# Patient Record
(demographics unavailable — no encounter records)

---

## 2025-05-23 NOTE — PLAN
[FreeTextEntry1] : recommend barrier cream - aquaphor or desitin recommend fluconazole 150mg x 1 dose - may repeat in 2days if still symptomatic for yeast vaginitis can consider TVS to better evaluate pelvic organs secondary to limited exam

## 2025-05-23 NOTE — PHYSICAL EXAM
[Chaperoned Physical Exam] : A chaperone was present in the examining room during all aspects of the physical examination. [MA] : MA [Appropriately responsive] : appropriately responsive [Alert] : alert [No Acute Distress] : no acute distress [Soft] : soft [Non-tender] : non-tender [Non-distended] : non-distended [Oriented x3] : oriented x3 [Labia Majora] : normal [Labia Minora] : normal [Discharge] : a  ~M vaginal discharge was present [Heavy] : heavy [White] : white [Thick] : thick [Normal] : normal [Uterine Adnexae] : normal [FreeTextEntry1] : excoriated [FreeTextEntry6] : limited secondary to habitus

## 2025-05-23 NOTE — HISTORY OF PRESENT ILLNESS
[TextBox_4] : 57yo presents for annual exam h/o D&C hysteroscopy Pt is wheelchair bound no complaints, states vaginal bleeding has stopped [PapSmeardate] : 2023